# Patient Record
Sex: MALE | Race: WHITE | URBAN - METROPOLITAN AREA
[De-identification: names, ages, dates, MRNs, and addresses within clinical notes are randomized per-mention and may not be internally consistent; named-entity substitution may affect disease eponyms.]

---

## 2017-03-28 ENCOUNTER — OFFICE VISIT (OUTPATIENT)
Dept: FAMILY MEDICINE | Facility: CLINIC | Age: 32
End: 2017-03-28
Payer: COMMERCIAL

## 2017-03-28 VITALS
DIASTOLIC BLOOD PRESSURE: 87 MMHG | SYSTOLIC BLOOD PRESSURE: 133 MMHG | OXYGEN SATURATION: 97 % | HEIGHT: 69 IN | WEIGHT: 210 LBS | HEART RATE: 69 BPM | BODY MASS INDEX: 31.1 KG/M2 | TEMPERATURE: 97.9 F

## 2017-03-28 DIAGNOSIS — G47.09 OTHER INSOMNIA: ICD-10-CM

## 2017-03-28 DIAGNOSIS — F39 MOOD DISORDER (H): Primary | ICD-10-CM

## 2017-03-28 PROCEDURE — 99203 OFFICE O/P NEW LOW 30 MIN: CPT | Performed by: FAMILY MEDICINE

## 2017-03-28 RX ORDER — BUPROPION HYDROCHLORIDE 300 MG/1
300 TABLET ORAL DAILY
Qty: 90 TABLET | Refills: 1 | Status: SHIPPED | OUTPATIENT
Start: 2017-03-28 | End: 2017-09-15

## 2017-03-28 RX ORDER — QUETIAPINE FUMARATE 25 MG/1
25 TABLET, FILM COATED ORAL 2 TIMES DAILY
COMMUNITY
End: 2017-07-26

## 2017-03-28 ASSESSMENT — ANXIETY QUESTIONNAIRES
GAD7 TOTAL SCORE: 0
7. FEELING AFRAID AS IF SOMETHING AWFUL MIGHT HAPPEN: NOT AT ALL
5. BEING SO RESTLESS THAT IT IS HARD TO SIT STILL: NOT AT ALL
6. BECOMING EASILY ANNOYED OR IRRITABLE: NOT AT ALL
IF YOU CHECKED OFF ANY PROBLEMS ON THIS QUESTIONNAIRE, HOW DIFFICULT HAVE THESE PROBLEMS MADE IT FOR YOU TO DO YOUR WORK, TAKE CARE OF THINGS AT HOME, OR GET ALONG WITH OTHER PEOPLE: NOT DIFFICULT AT ALL
2. NOT BEING ABLE TO STOP OR CONTROL WORRYING: NOT AT ALL
1. FEELING NERVOUS, ANXIOUS, OR ON EDGE: NOT AT ALL
3. WORRYING TOO MUCH ABOUT DIFFERENT THINGS: NOT AT ALL

## 2017-03-28 ASSESSMENT — PATIENT HEALTH QUESTIONNAIRE - PHQ9: 5. POOR APPETITE OR OVEREATING: NOT AT ALL

## 2017-03-28 NOTE — NURSING NOTE
"Chief Complaint   Patient presents with     Depression     Anxiety       Initial There were no vitals taken for this visit. Estimated body mass index is 30.13 kg/(m^2) as calculated from the following:    Height as of 1/4/16: 5' 10\" (1.778 m).    Weight as of 1/4/16: 210 lb (95.3 kg).  Medication Reconciliation: complete    "

## 2017-03-28 NOTE — NURSING NOTE
"Chief Complaint   Patient presents with     Depression     Anxiety       Initial /87  Pulse 69  Temp 97.9  F (36.6  C) (Oral)  Ht 5' 9\" (1.753 m)  Wt 210 lb (95.3 kg)  SpO2 97%  BMI 31.01 kg/m2 Estimated body mass index is 31.01 kg/(m^2) as calculated from the following:    Height as of this encounter: 5' 9\" (1.753 m).    Weight as of this encounter: 210 lb (95.3 kg).  Medication Reconciliation: complete   Radha Mcconnell CMA    "

## 2017-03-28 NOTE — MR AVS SNAPSHOT
"              After Visit Summary   3/28/2017    Massimo Basilio    MRN: [id number]           Patient Information     Date Of Birth          1985        Visit Information        Provider Department      3/28/2017 6:30 PM Javad Kim MD [facility]        Today's Diagnoses     Mood disorder (H)    -  1    Other insomnia           Follow-ups after your visit        Who to contact     If you have questions or need follow up information about today's clinic visit or your schedule please contact [facility] directly at [phone].  Normal or non-critical lab and imaging results will be communicated to you by Touchmediahart, letter or phone within 4 business days after the clinic has received the results. If you do not hear from us within 7 days, please contact the clinic through Touchmediahart or phone. If you have a critical or abnormal lab result, we will notify you by phone as soon as possible.  Submit refill requests through Neo [employer] or call your pharmacy and they will forward the refill request to us. Please allow 3 business days for your refill to be completed.          Additional Information About Your Visit        MyChart Information     [employer] lets you send messages to your doctor, view your test results, renew your prescriptions, schedule appointments and more. To sign up, go to [email or web address] . Click on \"Log in\" on the left side of the screen, which will take you to the Welcome page. Then click on \"Sign up Now\" on the right side of the page.     You will be asked to enter the access code listed below, as well as some personal information. Please follow the directions to create your username and password.     Your access code is: [email or web address]  Expires: 2017  6:56 PM     Your access code will  in 90 days. If you need help or a new code, please call your [facility]) or [phone].        Care EveryWhere ID     This is your Care EveryWhere ID. This could be " "used by other organizations to access your [place] medical records  [id number]        Your Vitals Were     Pulse Temperature Height Pulse Oximetry BMI (Body Mass Index)       69 97.9  F (36.6  C) (Oral) 5' 9\" (1.753 m) 97% 31.01 kg/m2        Blood Pressure from Last 3 Encounters:   03/28/17 133/87   01/04/16 133/82    Weight from Last 3 Encounters:   03/28/17 210 lb (95.3 kg)   01/04/16 210 lb (95.3 kg)              We Performed the Following     DEPRESSION ACTION PLAN (DAP)          Today's Medication Changes          These changes are accurate as of: 3/28/17  6:56 PM.  If you have any questions, ask your nurse or doctor.               These medicines have changed or have updated prescriptions.        Dose/Directions    buPROPion 300 MG 24 hr tablet   Commonly known as:  WELLBUTRIN XL   This may have changed:  medication strength   Used for:  Mood disorder (H)   Changed by:  Javad Kim MD        Dose:  300 mg   Take 1 tablet (300 mg) by mouth daily   Quantity:  90 tablet   Refills:  1            Where to get your medicines      These medications were sent to [id number] - [place] [address] [place] AT [address] [place], [place] [address]     Phone:  [phone]     buPROPion 300 MG 24 hr tablet                Primary Care Provider Office Phone # Fax #    [facility] [phone] [address] [place]  [facility] [address]        Thank you!     Thank you for choosing [facility]  for your care. Our goal is always to provide you with excellent care. Hearing back from our patients is one way we can continue to improve our services. Please take a few minutes to complete the written survey that you may receive in the mail after your visit with us. Thank you!             Your Updated Medication List - Protect others around you: Learn how to safely use, store and throw away your medicines at " www.disposemymeds.org.          This list is accurate as of: 3/28/17  6:56 PM.  Always use your most recent med list.                   Brand Name Dispense Instructions for use    buPROPion 300 MG 24 hr tablet    WELLBUTRIN XL    90 tablet    Take 1 tablet (300 mg) by mouth daily       QUEtiapine 25 MG tablet    SEROquel     Take 25 mg by mouth 2 times daily

## 2017-03-28 NOTE — PROGRESS NOTES
"SUBJECTIVE:  Massimo Basilio, a 32 year old male scheduled an appointment to discuss the following issues:  Follow-up psych meds.   Out wellbutrin.   Formal diagnosis - depression/anxiety. No bipolar or shizophrenia. Labs ok in past.   Some exercise. Single. No children. Work ok.   No therapist currently - was in past. No SUICIAL IDEATION OR HOMOCIDAL IDEATION OR ALICE.   Sleep ok - seroquel - taking everynight. No major snoring. Caffeine - stopped pop - 15 lbs mountain dew off.  No chest pain or shortness of breath. No regular ALCOHOL.  No illicit drugs.   No family history dm.  Medical, social, surgical, and family histories reviewed.    ROS:    OBJECTIVE:  /87  Pulse 69  Temp 97.9  F (36.6  C) (Oral)  Ht 5' 9\" (1.753 m)  Wt 210 lb (95.3 kg)  SpO2 97%  BMI 31.01 kg/m2    EXAM:  GENERAL APPEARANCE: healthy, alert and no distress  NECK: no adenopathy, no asymmetry, masses, or scars and thyroid normal to palpation  RESP: lungs clear to auscultation - no rales, rhonchi or wheezes  CV: regular rates and rhythm, normal S1 S2, no S3 or S4 and no murmur, click or rub -  ABDOMEN:  soft, nontender, no HSM or masses and bowel sounds normal  MS: extremities normal- no gross deformities noted, no evidence of inflammation in joints, FROM in all extremities.  PSYCH: mentation appears normal and affect normal/bright    ASSESSMENT / PLAN:  (F39) Mood disorder (H)  (primary encounter diagnosis)  Comment: stable  Plan: DEPRESSION ACTION PLAN (DAP), buPROPion         (WELLBUTRIN XL) 300 MG 24 hr tablet        Exercise and continue limit caffeine/ avoid ALCOHOL. If SUICIAL IDEATION OR HOMOCIDAL IDEATION OR ALICE TO ER. Recheck in 6 months  Repeat labs/etc in fall. Call/email with questions/concerns.     (G47.09) Other insomnia  Comment: stable  Plan: continue seroquel. Glad patient stopped pop. Recheck in 6 months    Javad Kim MD      "

## 2017-03-29 ASSESSMENT — PATIENT HEALTH QUESTIONNAIRE - PHQ9: SUM OF ALL RESPONSES TO PHQ QUESTIONS 1-9: 0

## 2017-03-29 ASSESSMENT — ANXIETY QUESTIONNAIRES: GAD7 TOTAL SCORE: 0

## 2017-09-14 ENCOUNTER — TELEPHONE (OUTPATIENT)
Dept: FAMILY MEDICINE | Facility: CLINIC | Age: 32
End: 2017-09-14

## 2017-09-14 DIAGNOSIS — F39 MOOD DISORDER (H): ICD-10-CM

## 2017-09-15 RX ORDER — QUETIAPINE FUMARATE 25 MG/1
TABLET, FILM COATED ORAL
Qty: 69 TABLET | Refills: 0 | Status: SHIPPED | OUTPATIENT
Start: 2017-09-15 | End: 2018-04-09

## 2017-09-15 RX ORDER — BUPROPION HYDROCHLORIDE 300 MG/1
300 TABLET ORAL DAILY
Qty: 30 TABLET | Refills: 0 | Status: SHIPPED | OUTPATIENT
Start: 2017-09-15 | End: 2017-10-03

## 2017-09-15 NOTE — TELEPHONE ENCOUNTER
Mother, María, calling on behalf of patient. Patient is out of seroquel and running low on wellbutrin. Mother stated that patient's refill request was denied because he needed to make an appointment for further refills. He works everyday and doesn't get off until 530pm. He is scheduled for Dr. Kim's next available appointment in the evening. Can Dr. Kim or any covering provider approve a refill for patient as soon as possible? It could be a refill just enough to get him through to his appointment. Mother also said that she is worried that if he doesn't get his medications, he will relapse. He tried to commit suicide 3 times before but he has been fine since he's been on the medications. It has probably been about a year since he's been on the medications. He needs the medications to be able to function. He has ADHD and has trouble managing appointments. Please call patient to discuss if this can be refilled as soon as possible, hopefully tomorrow? Okay to leave detailed message.    Radha AMAYA  Central Scheduler

## 2017-09-15 NOTE — TELEPHONE ENCOUNTER
Refilled per RN refill protocol.  Left message on pt vm that refills of Seroquel and Wellbutrin  Sent to pharmacy and he needs to keep upcoming appointment for further refills.  Radha Berkowitz RN

## 2017-09-15 NOTE — TELEPHONE ENCOUNTER
Reason for Call:  Medication or medication refill:    Do you use a [facility]?  Name of the pharmacy and phone number for the current request:  [facility] [place] [phone]    Name of the medication requested: QUEtiapine (SEROQUEL) 25 MG tablet    Other request: patient is out of this medication     Can we leave a detailed message on this number? YES    Phone number patient can be reached at: Home number on file [phone] (home)    Best Time:     Call taken on 9/15/2017 at 8:19 AM by Patrica Keith

## 2017-10-03 ENCOUNTER — OFFICE VISIT (OUTPATIENT)
Dept: FAMILY MEDICINE | Facility: CLINIC | Age: 32
End: 2017-10-03
Payer: COMMERCIAL

## 2017-10-03 VITALS
HEIGHT: 69 IN | SYSTOLIC BLOOD PRESSURE: 124 MMHG | BODY MASS INDEX: 30.36 KG/M2 | OXYGEN SATURATION: 96 % | TEMPERATURE: 98.3 F | WEIGHT: 205 LBS | HEART RATE: 60 BPM | DIASTOLIC BLOOD PRESSURE: 78 MMHG

## 2017-10-03 DIAGNOSIS — Z00.00 ROUTINE GENERAL MEDICAL EXAMINATION AT A HEALTH CARE FACILITY: ICD-10-CM

## 2017-10-03 DIAGNOSIS — G47.09 OTHER INSOMNIA: ICD-10-CM

## 2017-10-03 DIAGNOSIS — F39 MOOD DISORDER (H): Primary | ICD-10-CM

## 2017-10-03 LAB
ERYTHROCYTE [DISTWIDTH] IN BLOOD BY AUTOMATED COUNT: 12.2 % (ref 10–15)
HCT VFR BLD AUTO: 45.6 % (ref 40–53)
HGB BLD-MCNC: 15.5 G/DL (ref 13.3–17.7)
MCH RBC QN AUTO: 30.8 PG (ref 26.5–33)
MCHC RBC AUTO-ENTMCNC: 34 G/DL (ref 31.5–36.5)
MCV RBC AUTO: 91 FL (ref 78–100)
PLATELET # BLD AUTO: 209 10E9/L (ref 150–450)
RBC # BLD AUTO: 5.03 10E12/L (ref 4.4–5.9)
WBC # BLD AUTO: 6.5 10E9/L (ref 4–11)

## 2017-10-03 PROCEDURE — 99214 OFFICE O/P EST MOD 30 MIN: CPT | Mod: 25 | Performed by: FAMILY MEDICINE

## 2017-10-03 PROCEDURE — 36415 COLL VENOUS BLD VENIPUNCTURE: CPT | Performed by: FAMILY MEDICINE

## 2017-10-03 PROCEDURE — 85027 COMPLETE CBC AUTOMATED: CPT | Performed by: FAMILY MEDICINE

## 2017-10-03 PROCEDURE — 80053 COMPREHEN METABOLIC PANEL: CPT | Performed by: FAMILY MEDICINE

## 2017-10-03 RX ORDER — QUETIAPINE FUMARATE 100 MG/1
100 TABLET, FILM COATED ORAL AT BEDTIME
Qty: 90 TABLET | Refills: 1 | Status: SHIPPED | OUTPATIENT
Start: 2017-10-03 | End: 2018-03-07

## 2017-10-03 RX ORDER — BUPROPION HYDROCHLORIDE 300 MG/1
300 TABLET ORAL DAILY
Qty: 90 TABLET | Refills: 1 | Status: SHIPPED | OUTPATIENT
Start: 2017-10-03 | End: 2018-04-09

## 2017-10-03 ASSESSMENT — ANXIETY QUESTIONNAIRES
7. FEELING AFRAID AS IF SOMETHING AWFUL MIGHT HAPPEN: NOT AT ALL
2. NOT BEING ABLE TO STOP OR CONTROL WORRYING: NOT AT ALL
5. BEING SO RESTLESS THAT IT IS HARD TO SIT STILL: NOT AT ALL
6. BECOMING EASILY ANNOYED OR IRRITABLE: NOT AT ALL
3. WORRYING TOO MUCH ABOUT DIFFERENT THINGS: NOT AT ALL
1. FEELING NERVOUS, ANXIOUS, OR ON EDGE: SEVERAL DAYS
GAD7 TOTAL SCORE: 2

## 2017-10-03 ASSESSMENT — PATIENT HEALTH QUESTIONNAIRE - PHQ9
5. POOR APPETITE OR OVEREATING: SEVERAL DAYS
SUM OF ALL RESPONSES TO PHQ QUESTIONS 1-9: 0

## 2017-10-03 NOTE — MR AVS SNAPSHOT
"              After Visit Summary   10/3/2017    Massimo Basilio    MRN: [id number]           Patient Information     Date Of Birth          1985        Visit Information        Provider Department      10/3/2017 6:30 PM Javad Kim MD [facility]        Today's Diagnoses     Mood disorder (H)    -  1    Other insomnia        Routine general medical examination at a health care facility           Follow-ups after your visit        Who to contact     If you have questions or need follow up information about today's clinic visit or your schedule please contact [facility] directly at [phone].  Normal or non-critical lab and imaging results will be communicated to you by ExactTargethart, letter or phone within 4 business days after the clinic has received the results. If you do not hear from us within 7 days, please contact the clinic through ExactTargethart or phone. If you have a critical or abnormal lab result, we will notify you by phone as soon as possible.  Submit refill requests through [employer] or call your pharmacy and they will forward the refill request to us. Please allow 3 business days for your refill to be completed.          Additional Information About Your Visit        MyChart Information     Sovi lets you send messages to your doctor, view your test results, renew your prescriptions, schedule appointments and more. To sign up, go to [email or web address] . Click on \"Log in\" on the left side of the screen, which will take you to the Welcome page. Then click on \"Sign up Now\" on the right side of the page.     You will be asked to enter the access code listed below, as well as some personal information. Please follow the directions to create your username and password.     Your access code is: [email or web address]  Expires: 2018  6:59 PM     Your access code will  in 90 days. If you need help or a new code, please call your [facility] or [phone].        Care " "EveryWhere ID     This is your Care EveryWhere ID. This could be used by other organizations to access your [place] medical records  [id number]        Your Vitals Were     Pulse Temperature Height Pulse Oximetry BMI (Body Mass Index)       60 98.3  F (36.8  C) (Oral) 5' 9\" (1.753 m) 96% 30.27 kg/m2        Blood Pressure from Last 3 Encounters:   10/03/17 124/78   03/28/17 133/87   01/04/16 133/82    Weight from Last 3 Encounters:   10/03/17 205 lb (93 kg)   03/28/17 210 lb (95.3 kg)   01/04/16 210 lb (95.3 kg)              We Performed the Following     CBC with platelets     Comprehensive metabolic panel (BMP + Alb, Alk Phos, ALT, AST, Total. Bili, TP)     DEPRESSION ACTION PLAN (DAP)          Today's Medication Changes          These changes are accurate as of: 10/3/17  6:59 PM.  If you have any questions, ask your nurse or doctor.               These medicines have changed or have updated prescriptions.        Dose/Directions    * QUEtiapine 25 MG tablet   Commonly known as:  SEROquel   This may have changed:  Another medication with the same name was added. Make sure you understand how and when to take each.   Used for:  Mood disorder (H)   Changed by:  Javad Kim MD        TAKE 1 TO 2 TABLETS BY MOUTH THREE TIMES DAILY   Quantity:  69 tablet   Refills:  0       * QUEtiapine 100 MG tablet   Commonly known as:  SEROquel   This may have changed:  You were already taking a medication with the same name, and this prescription was added. Make sure you understand how and when to take each.   Used for:  Mood disorder (H), Other insomnia   Changed by:  Javad Kim MD        Dose:  100 mg   Take 1 tablet (100 mg) by mouth At Bedtime   Quantity:  90 tablet   Refills:  1       * Notice:  This list has 2 medication(s) that are the same as other medications prescribed for you. Read the directions carefully, and ask your doctor or other care provider to review them with you.         Where to get your " medicines      These medications were sent to [facility] - [place], [place] - [address] AT [facility]  [address] [place], [place] [address]     Phone:  [phone]     buPROPion 300 MG 24 hr tablet    QUEtiapine 100 MG tablet                Primary Care Provider Office Phone # Fax #    Javad Yovanny Kim [phone] [address] [facility] [address]        Equal Access to Services     [facility]: Hadii aad ku hadasho Soomaali, waaxda luqadaha, qaybta kaalmada adeegyada, waxay idiin hayaan adecarin vargasarasweta childers . So [facility] [phone].    ATENCIÓN: Si habla español, tiene a cheung disposición servicios gratuitos de asistencia lingüística. [facility] [phone].    We comply with applicable federal civil rights laws and [place] laws. We do not discriminate on the basis of race, color, national origin, age, disability, sex, sexual orientation, or gender identity.            Thank you!     Thank you for choosing [facility]  for your care. Our goal is always to provide you with excellent care. Hearing back from our patients is one way we can continue to improve our services. Please take a few minutes to complete the written survey that you may receive in the mail after your visit with us. Thank you!             Your Updated Medication List - Protect others around you: Learn how to safely use, store and throw away your medicines at www.disposemymeds.org.          This list is accurate as of: 10/3/17  6:59 PM.  Always use your most recent med list.                   Brand Name Dispense Instructions for use Diagnosis    buPROPion 300 MG 24 hr tablet    WELLBUTRIN XL    90 tablet    Take 1 tablet (300 mg) by mouth daily    Mood disorder (H)       * QUEtiapine 25 MG tablet    SEROquel    69 tablet    TAKE 1 TO 2 TABLETS BY MOUTH THREE TIMES DAILY    Mood disorder (H)       * QUEtiapine 100 MG tablet    SEROquel    90 tablet     Take 1 tablet (100 mg) by mouth At Bedtime    Mood disorder (H), Other insomnia       * Notice:  This list has 2 medication(s) that are the same as other medications prescribed for you. Read the directions carefully, and ask your doctor or other care provider to review them with you.

## 2017-10-03 NOTE — PROGRESS NOTES
"SUBJECTIVE:  Massimo Basilio, a 32 year old male scheduled an appointment to discuss the following issues:  Follow-up mood disorder and insomnia.   Home and work ok. Exercise regularly.  Sleep. No chest pain or shortness of breath. No nausea, vomiting or diarrhea. No abdominal pain.   No follow-up diabetes. Limited simple carbs. No pop.    Past Medical History:   Diagnosis Date     Depressive disorder        No past surgical history on file.    No family history on file.    Social History   Substance Use Topics     Smoking status: Former Smoker     Smokeless tobacco: Not on file      Comment: he chew nicotine gum      Alcohol use Not on file       ROS:  All other ROS negative  OBJECTIVE:  /78  Pulse 60  Temp 98.3  F (36.8  C) (Oral)  Ht 5' 9\" (1.753 m)  Wt 205 lb (93 kg)  SpO2 96%  BMI 30.27 kg/m2  EXAM:  GENERAL APPEARANCE: healthy, alert and no distress  RESP: lungs clear to auscultation - no rales, rhonchi or wheezes  CV: regular rates and rhythm, normal S1 S2, no S3 or S4 and no murmur, click or rub -  ABDOMEN:  soft, nontender, no HSM or masses and bowel sounds normal  PSYCH: mentation appears normal and affect normal/bright    ASSESSMENT / PLAN:  (F39) Mood disorder (H)  (primary encounter diagnosis)  Comment: stable  Plan: DEPRESSION ACTION PLAN (DAP), Comprehensive         metabolic panel (BMP + Alb, Alk Phos, ALT, AST,        Total. Bili, TP), CBC with platelets,         QUEtiapine (SEROQUEL) 100 MG tablet, buPROPion         (WELLBUTRIN XL) 300 MG 24 hr tablet        Lower simple carbs in diet. Non-fasting. Reveiwed risks and side effects of medication  Recheck in 6 months  Sooner if worse. If SUICIAL IDEATION OR HOMOCIDAL IDEATION OR ALICE TO ER. Continue exercise. Call/email with questions/concerns.     (G47.09) Other insomnia  Comment: stable  Plan: QUEtiapine (SEROQUEL) 100 MG tablet        Reveiwed risks and side effects of medication  Limit caffeine and ALCOHOL.    Javad Kim " MD Javad Kim

## 2017-10-03 NOTE — NURSING NOTE
"Chief Complaint   Patient presents with     Depression       Initial /78  Pulse 60  Temp 98.3  F (36.8  C) (Oral)  Ht 5' 9\" (1.753 m)  Wt 205 lb (93 kg)  SpO2 96%  BMI 30.27 kg/m2 Estimated body mass index is 30.27 kg/(m^2) as calculated from the following:    Height as of this encounter: 5' 9\" (1.753 m).    Weight as of this encounter: 205 lb (93 kg).  Medication Reconciliation: complete  Radha Mcconnell CMA    "

## 2017-10-03 NOTE — LETTER
October 4, 2017    Massimo Basilio  [address] [place] [address]        Dear Massimo,    Generally normal results except calcium slightly low. Drink more milk or get more caclcium/vitaminD.    If you have any questions or concerns, please call myself or my nurse at [phone].    Sincerely,    Javad Kim MD/neal      Results for orders placed or performed in visit on 10/03/17   Comprehensive metabolic panel (BMP + Alb, Alk Phos, ALT, AST, Total. Bili, TP)   Result Value Ref Range    Sodium 138 133 - 144 mmol/L    Potassium 4.2 3.4 - 5.3 mmol/L    Chloride 104 94 - 109 mmol/L    Carbon Dioxide 28 20 - 32 mmol/L    Anion Gap 6 3 - 14 mmol/L    Glucose 84 70 - 99 mg/dL    Urea Nitrogen 13 7 - 30 mg/dL    Creatinine 1.23 0.66 - 1.25 mg/dL    GFR Estimate 68 >60 mL/min/1.7m2    GFR Estimate If Black 82 >60 mL/min/1.7m2    Calcium 8.4 (L) 8.5 - 10.1 mg/dL    Bilirubin Total 0.4 0.2 - 1.3 mg/dL    Albumin 3.7 3.4 - 5.0 g/dL    Protein Total 7.0 6.8 - 8.8 g/dL    Alkaline Phosphatase 63 40 - 150 U/L    ALT 23 0 - 70 U/L    AST 17 0 - 45 U/L   CBC with platelets   Result Value Ref Range    WBC 6.5 4.0 - 11.0 10e9/L    RBC Count 5.03 4.4 - 5.9 10e12/L    Hemoglobin 15.5 13.3 - 17.7 g/dL    Hematocrit 45.6 40.0 - 53.0 %    MCV 91 78 - 100 fl    MCH 30.8 26.5 - 33.0 pg    MCHC 34.0 31.5 - 36.5 g/dL    RDW 12.2 10.0 - 15.0 %    Platelet Count 209 150 - 450 10e9/L

## 2017-10-04 LAB
ALBUMIN SERPL-MCNC: 3.7 G/DL (ref 3.4–5)
ALP SERPL-CCNC: 63 U/L (ref 40–150)
ALT SERPL W P-5'-P-CCNC: 23 U/L (ref 0–70)
ANION GAP SERPL CALCULATED.3IONS-SCNC: 6 MMOL/L (ref 3–14)
AST SERPL W P-5'-P-CCNC: 17 U/L (ref 0–45)
BILIRUB SERPL-MCNC: 0.4 MG/DL (ref 0.2–1.3)
BUN SERPL-MCNC: 13 MG/DL (ref 7–30)
CALCIUM SERPL-MCNC: 8.4 MG/DL (ref 8.5–10.1)
CHLORIDE SERPL-SCNC: 104 MMOL/L (ref 94–109)
CO2 SERPL-SCNC: 28 MMOL/L (ref 20–32)
CREAT SERPL-MCNC: 1.23 MG/DL (ref 0.66–1.25)
GFR SERPL CREATININE-BSD FRML MDRD: 68 ML/MIN/1.7M2
GLUCOSE SERPL-MCNC: 84 MG/DL (ref 70–99)
POTASSIUM SERPL-SCNC: 4.2 MMOL/L (ref 3.4–5.3)
PROT SERPL-MCNC: 7 G/DL (ref 6.8–8.8)
SODIUM SERPL-SCNC: 138 MMOL/L (ref 133–144)

## 2017-10-04 ASSESSMENT — ANXIETY QUESTIONNAIRES: GAD7 TOTAL SCORE: 2

## 2018-04-03 ENCOUNTER — TELEPHONE (OUTPATIENT)
Dept: FAMILY MEDICINE | Facility: CLINIC | Age: 33
End: 2018-04-03

## 2018-04-03 DIAGNOSIS — F39 MOOD DISORDER (H): ICD-10-CM

## 2018-04-03 DIAGNOSIS — G47.09 OTHER INSOMNIA: ICD-10-CM

## 2018-04-03 RX ORDER — QUETIAPINE FUMARATE 100 MG/1
100 TABLET, FILM COATED ORAL AT BEDTIME
Qty: 5 TABLET | Refills: 0 | Status: SHIPPED | OUTPATIENT
Start: 2018-04-03 | End: 2018-04-09

## 2018-04-03 NOTE — TELEPHONE ENCOUNTER
Patient calling to request a refill on Seroquel. He only has 2 - 3 pills left. He needs an appointment for further refills, and has one set up on 4/9, however he will be out of medication before then. He is wondering if he can get at least a one week refill to get him to that appointment.

## 2018-04-09 ENCOUNTER — OFFICE VISIT (OUTPATIENT)
Dept: FAMILY MEDICINE | Facility: CLINIC | Age: 33
End: 2018-04-09
Payer: COMMERCIAL

## 2018-04-09 VITALS
HEART RATE: 68 BPM | OXYGEN SATURATION: 96 % | BODY MASS INDEX: 30.36 KG/M2 | RESPIRATION RATE: 15 BRPM | TEMPERATURE: 99 F | WEIGHT: 205 LBS | DIASTOLIC BLOOD PRESSURE: 83 MMHG | SYSTOLIC BLOOD PRESSURE: 123 MMHG | HEIGHT: 69 IN

## 2018-04-09 DIAGNOSIS — F39 MOOD DISORDER (H): ICD-10-CM

## 2018-04-09 DIAGNOSIS — G47.09 OTHER INSOMNIA: ICD-10-CM

## 2018-04-09 PROCEDURE — 99214 OFFICE O/P EST MOD 30 MIN: CPT | Performed by: FAMILY MEDICINE

## 2018-04-09 RX ORDER — BUPROPION HYDROCHLORIDE 300 MG/1
300 TABLET ORAL DAILY
Qty: 90 TABLET | Refills: 1 | Status: SHIPPED | OUTPATIENT
Start: 2018-04-09

## 2018-04-09 RX ORDER — QUETIAPINE FUMARATE 100 MG/1
100 TABLET, FILM COATED ORAL AT BEDTIME
Qty: 90 TABLET | Refills: 1 | Status: SHIPPED | OUTPATIENT
Start: 2018-04-09

## 2018-04-09 ASSESSMENT — ANXIETY QUESTIONNAIRES
1. FEELING NERVOUS, ANXIOUS, OR ON EDGE: NOT AT ALL
GAD7 TOTAL SCORE: 0
2. NOT BEING ABLE TO STOP OR CONTROL WORRYING: NOT AT ALL
3. WORRYING TOO MUCH ABOUT DIFFERENT THINGS: NOT AT ALL
7. FEELING AFRAID AS IF SOMETHING AWFUL MIGHT HAPPEN: NOT AT ALL
IF YOU CHECKED OFF ANY PROBLEMS ON THIS QUESTIONNAIRE, HOW DIFFICULT HAVE THESE PROBLEMS MADE IT FOR YOU TO DO YOUR WORK, TAKE CARE OF THINGS AT HOME, OR GET ALONG WITH OTHER PEOPLE: NOT DIFFICULT AT ALL
5. BEING SO RESTLESS THAT IT IS HARD TO SIT STILL: NOT AT ALL
6. BECOMING EASILY ANNOYED OR IRRITABLE: NOT AT ALL

## 2018-04-09 ASSESSMENT — PATIENT HEALTH QUESTIONNAIRE - PHQ9: 5. POOR APPETITE OR OVEREATING: NOT AT ALL

## 2018-04-09 NOTE — LETTER
My Depression Action Plan  Name: Massimo Basilio   Date of Birth 1985  Date: 4/6/2018    My doctor: Javad Kim   My clinic: [facility]  [id number] [place] [id number] [phone]          GREEN    ZONE   Good Control    What it looks like:     Things are going generally well. You have normal up s and down s. You may even feel depressed from time to time, but bad moods usually last less than a day.   What you need to do:  1. Continue to care for yourself (see self care plan)  2. Check your depression survival kit and update it as needed  3. Follow your physician s recommendations including any medication.  4. Do not stop taking medication unless you consult with your physician first.           YELLOW         ZONE Getting Worse    What it looks like:     Depression is starting to interfere with your life.     It may be hard to get out of bed; you may be starting to isolate yourself from others.    Symptoms of depression are starting to last most all day and this has happened for several days.     You may have suicidal thoughts but they are not constant.   What you need to do:     1. Call your care team, your response to treatment will improve if you keep your care team informed of your progress. Yellow periods are signs an adjustment may need to be made.     2. Continue your self-care, even if you have to fake it!    3. Talk to someone in your support network    4. Open up your depression survival kit           RED    ZONE Medical Alert - Get Help    What it looks like:     Depression is seriously interfering with your life.     You may experience these or other symptoms: You can t get out of bed most days, can t work or engage in other necessary activities, you have trouble taking care of basic hygiene, or basic responsibilities, thoughts of suicide or death that will not go away, self-injurious behavior.     What you need to do:  1. Call your care team and  request a same-day appointment. If they are not available (weekends or after hours) call your local crisis line, emergency room or 911.            Depression Self Care Plan / Survival Kit    Self-Care for Depression  Here s the deal. Your body and mind are really not as separate as most people think.  What you do and think affects how you feel and how you feel influences what you do and think. This means if you do things that people who feel good do, it will help you feel better.  Sometimes this is all it takes.  There is also a place for medication and therapy depending on how severe your depression is, so be sure to consult with your medical provider and/ or Behavioral Health Consultant if your symptoms are worsening or not improving.     In order to better manage my stress, I will:    Exercise  Get some form of exercise, every day. This will help reduce pain and release endorphins, the  feel good  chemicals in your brain. This is almost as good as taking antidepressants!  This is not the same as joining a gym and then never going! (they count on that by the way ) It can be as simple as just going for a walk or doing some gardening, anything that will get you moving.      Hygiene   Maintain good hygiene (Get out of bed in the morning, Make your bed, Brush your teeth, Take a shower, and Get dressed like you were going to work, even if you are unemployed).  If your clothes don't fit try to get ones that do.    Diet  I will strive to eat foods that are good for me, drink plenty of water, and avoid excessive sugar, caffeine, alcohol, and other mood-altering substances.  Some foods that are helpful in depression are: complex carbohydrates, B vitamins, flaxseed, fish or fish oil, fresh fruits and vegetables.    Psychotherapy  I agree to participate in Individual Therapy (if recommended).    Medication  If prescribed medications, I agree to take them.  Missing doses can result in serious side effects.  I understand that  drinking alcohol, or other illicit drug use, may cause potential side effects.  I will not stop my medication abruptly without first discussing it with my provider.    Staying Connected With Others  I will stay in touch with my friends, family members, and my primary care provider/team.    Use your imagination  Be creative.  We all have a creative side; it doesn t matter if it s oil painting, sand castles, or mud pies! This will also kick up the endorphins.    Witness Beauty  (AKA stop and smell the roses) Take a look outside, even in mid-winter. Notice colors, textures. Watch the squirrels and birds.     Service to others  Be of service to others.  There is always someone else in need.  By helping others we can  get out of ourselves  and remember the really important things.  This also provides opportunities for practicing all the other parts of the program.    Humor  Laugh and be silly!  Adjust your TV habits for less news and crime-drama and more comedy.    Control your stress  Try breathing deep, massage therapy, biofeedback, and meditation. Find time to relax each day.     My support system    Clinic Contact:  Phone number:    Contact 1:  Phone number:    Contact 2:  Phone number:    Mandaen/:  Phone number:    Therapist:  Phone number:    Local crisis center:    Phone number:    Other community support:  Phone number:

## 2018-04-09 NOTE — MR AVS SNAPSHOT
"              After Visit Summary   2018    Massimo Basilio    MRN: [id number]           Patient Information     Date Of Birth          1985        Visit Information        Provider Department      2018 7:35 AM Javad Kim MD [facility]        Today's Diagnoses     Mood disorder (H)        Other insomnia           Follow-ups after your visit        Who to contact     If you have questions or need follow up information about today's clinic visit or your schedule please contact [facility] directly at [phone].  Normal or non-critical lab and imaging results will be communicated to you by MyChart, letter or phone within 4 business days after the clinic has received the results. If you do not hear from us within 7 days, please contact the clinic through [employer] or phone. If you have a critical or abnormal lab result, we will notify you by phone as soon as possible.  Submit refill requests through [employer] or call your pharmacy and they will forward the refill request to us. Please allow 3 business days for your refill to be completed.          Additional Information About Your Visit        MyChart Information     [employer] lets you send messages to your doctor, view your test results, renew your prescriptions, schedule appointments and more. To sign up, go to [email or web address] . Click on \"Log in\" on the left side of the screen, which will take you to the Welcome page. Then click on \"Sign up Now\" on the right side of the page.     You will be asked to enter the access code listed below, as well as some personal information. Please follow the directions to create your username and password.     Your access code is: [email or web address]  Expires: 2018  7:54 AM     Your access code will  in 90 days. If you need help or a new code, please call your [facility] or [phone].        Care EveryWhere ID     This is your Care EveryWhere ID. This could be used by " "other organizations to access your [place] medical records  [id number]        Your Vitals Were     Pulse Temperature Respirations Height Pulse Oximetry BMI (Body Mass Index)    68 99  F (37.2  C) (Oral) 15 5' 9\" (1.753 m) 96% 30.27 kg/m2       Blood Pressure from Last 3 Encounters:   04/09/18 123/83   10/03/17 124/78   03/28/17 133/87    Weight from Last 3 Encounters:   04/09/18 205 lb (93 kg)   10/03/17 205 lb (93 kg)   03/28/17 210 lb (95.3 kg)              Today, you had the following     No orders found for display         Today's Medication Changes          These changes are accurate as of 4/9/18  7:54 AM.  If you have any questions, ask your nurse or doctor.               These medicines have changed or have updated prescriptions.        Dose/Directions    buPROPion 300 MG 24 hr tablet   Commonly known as:  WELLBUTRIN XL   This may have changed:  additional instructions   Used for:  Mood disorder (H)   Changed by:  Javad Kim MD        Dose:  300 mg   Take 1 tablet (300 mg) by mouth daily Pharmacy ok to hold prescription until due   Quantity:  90 tablet   Refills:  1       QUEtiapine 100 MG tablet   Commonly known as:  SEROquel   This may have changed:  additional instructions   Used for:  Mood disorder (H), Other insomnia   Changed by:  Javad Kim MD        Dose:  100 mg   Take 1 tablet (100 mg) by mouth At Bedtime Pharmacy ok to hold prescription until due   Quantity:  90 tablet   Refills:  1            Where to get your medicines      These medications were sent to [id number] [address] [facility] [address] AT [place] [address] [address]     Phone:  [phone]     buPROPion 300 MG 24 hr tablet    QUEtiapine 100 MG tablet                Primary Care Provider Office Phone # Fax #    Javad Kim [phone] [address]  [facility] [address]        Equal Access to Services     " RADHA MACK : Hadii aad ku raleigh Wilkinson, waaxda luqadaha, qaybta kaalmada rennystellatheresa, josué jody vargasbrentsweta childers . So [facility] [phone].    ATENCIÓN: Si habla español, tiene a cheung disposición servicios gratuitos de asistencia lingüística. Llame al [phone].    We comply with applicable federal civil rights laws and [place] laws. We do not discriminate on the basis of race, color, national origin, age, disability, sex, sexual orientation, or gender identity.            Thank you!     Thank you for [facility]  [facility]. Our goal is always to provide you with excellent care. Hearing back from our patients is one way we can continue to improve our services. Please take a few minutes to complete the written survey that you may receive in the mail after your visit with us. Thank you!             Your Updated Medication List - Protect others around you: Learn how to safely use, store and throw away your medicines at www.disposemymeds.org.          This list is accurate as of 4/9/18  7:54 AM.  Always use your most recent med list.                   Brand Name Dispense Instructions for use Diagnosis    buPROPion 300 MG 24 hr tablet    WELLBUTRIN XL    90 tablet    Take 1 tablet (300 mg) by mouth daily Pharmacy ok to hold prescription until due    Mood disorder (H)       QUEtiapine 100 MG tablet    SEROquel    90 tablet    Take 1 tablet (100 mg) by mouth At Bedtime Pharmacy ok to hold prescription until due    Mood disorder (H), Other insomnia

## 2018-04-09 NOTE — PROGRESS NOTES
"SUBJECTIVE:  Massimo Basilio, a 33 year old male scheduled an appointment to discuss the following issues:  Follow-up insomnia/mood disorder  Some winter blues. Single. Work ok.   No kids. Exercise -needs more. No SUICIAL IDEATION OR HOMOCIDAL IDEATION OR ALICE. Sleep overall ok.   No chest pain or shortness of breath. No nausea, vomiting or diarrhea. No black or bloody stools. No urine changes or std concerns.   Occasionally milk. No vitman D supplement.   Past Medical History:   Diagnosis Date     Depressive disorder        History reviewed. No pertinent surgical history.    History reviewed. No pertinent family history.    Social History   Substance Use Topics     Smoking status: Former Smoker     Smokeless tobacco: Never Used      Comment: he chew nicotine gum      Alcohol use Yes      Comment: twice a month        ROS:  All other ROS negative    OBJECTIVE:  /83  Pulse 68  Temp 99  F (37.2  C) (Oral)  Resp 15  Ht 5' 9\" (1.753 m)  Wt 205 lb (93 kg)  SpO2 96%  BMI 30.27 kg/m2  EXAM:  GENERAL APPEARANCE: healthy, alert and no distress  RESP: lungs clear to auscultation - no rales, rhonchi or wheezes  CV: regular rates and rhythm, normal S1 S2, no S3 or S4 and no murmur, click or rub -  ABDOMEN:  soft, nontender, no HSM or masses and bowel sounds normal  PSYCH: mentation appears normal and affect normal/bright    ASSESSMENT / PLAN:  (F39) Mood disorder (H)  Comment: stable  Plan: QUEtiapine (SEROQUEL) 100 MG tablet, buPROPion         (WELLBUTRIN XL) 300 MG 24 hr tablet        Exercise. Reveiwed risks and side effects of medication  Recheck in 6 months  Sooner if worse. If SUICIAL IDEATION OR HOMOCIDAL IDEATION OR ALICE TO ER. Call/email with questions/concerns.      (G47.09) Other insomnia  Comment: stable  Plan: QUEtiapine (SEROQUEL) 100 MG tablet        Reveiwed risks and side effects of medication    Advised starting vitminD supplement. Repeat labs in fall. Expected course and warning signs " reviewed.     Javad Kim

## 2018-04-10 ASSESSMENT — PATIENT HEALTH QUESTIONNAIRE - PHQ9: SUM OF ALL RESPONSES TO PHQ QUESTIONS 1-9: 0

## 2018-04-10 ASSESSMENT — ANXIETY QUESTIONNAIRES: GAD7 TOTAL SCORE: 0
